# Patient Record
Sex: FEMALE | Race: OTHER | NOT HISPANIC OR LATINO | URBAN - METROPOLITAN AREA
[De-identification: names, ages, dates, MRNs, and addresses within clinical notes are randomized per-mention and may not be internally consistent; named-entity substitution may affect disease eponyms.]

---

## 2022-06-21 ENCOUNTER — APPOINTMENT (OUTPATIENT)
Dept: RADIOLOGY | Age: 62
End: 2022-06-21
Payer: COMMERCIAL

## 2022-06-21 ENCOUNTER — OFFICE VISIT (OUTPATIENT)
Dept: URGENT CARE | Age: 62
End: 2022-06-21
Payer: COMMERCIAL

## 2022-06-21 VITALS
WEIGHT: 154 LBS | TEMPERATURE: 97.5 F | BODY MASS INDEX: 25.66 KG/M2 | HEART RATE: 88 BPM | HEIGHT: 65 IN | RESPIRATION RATE: 18 BRPM | OXYGEN SATURATION: 99 %

## 2022-06-21 DIAGNOSIS — M25.552 LEFT HIP PAIN: ICD-10-CM

## 2022-06-21 DIAGNOSIS — M25.552 LEFT HIP PAIN: Primary | ICD-10-CM

## 2022-06-21 PROCEDURE — 73502 X-RAY EXAM HIP UNI 2-3 VIEWS: CPT

## 2022-06-21 PROCEDURE — 99213 OFFICE O/P EST LOW 20 MIN: CPT

## 2022-06-21 RX ORDER — METHOCARBAMOL 500 MG/1
500 TABLET, FILM COATED ORAL 3 TIMES DAILY
Qty: 15 TABLET | Refills: 0 | Status: SHIPPED | OUTPATIENT
Start: 2022-06-21 | End: 2022-06-26

## 2022-06-21 NOTE — PROGRESS NOTES
[address]        NAME: Zara Preston is a 63 y o  female  : 1960    MRN: [id number]  DATE: 2022  TIME: 4:28 PM    Assessment and Plan   Left hip pain [M25 552]  1  Left hip pain  XR hip/pelv 2-3 vws left if performed    Ambulatory Referral to Physical Therapy    Ambulatory Referral to Orthopedic Surgery    Diclofenac Sodium (VOLTAREN) 1 %    methocarbamol (ROBAXIN) 500 mg tablet     Patient states last Thursday she started having some left hip pain- she felt a jolt of pain that came towards front of leg  She googled at home stretches and felt better  On Saturday she felt the odd pop again  She was dancing with the children at [employer] over the weekend  There is some bruising to her outer left thigh  She denies injury, fall , trauma  She believes her hip and muscle are dislocated  Assessment notes mild swelling and bruising to left outer thigh  She has TTP  Has been ambulating and dancing on it  Mack Curio performed indicates no obvious abnormality  Referral to ortho and PT provided  Crutches were provided as requested by patient  Will also order short course of robaxin and diclofenac    Patient Instructions       Follow up with PCP as needed  Proceed to  ER if symptoms worsen  Chief Complaint     Chief Complaint   Patient presents with    Hip Pain     Left hip pain x4 days after feeling spasm  Patient states she feels it is out of place  Patient, however, was able to do dancing over the weekend  Patient has bruising to the left upper thigh  History of Present Illness       Patient states last Thursday she started having some left hip pain- she felt a jolt of pain that came towards front of leg  She googled at home stretches and felt better  On Saturday she felt the odd pop again  She was dancing with the children at [employer] over the weekend  There is some bruising to her outer left thigh  She denies injury, fall , trauma  She believes her hip and muscle are dislocated  Review of Systems   Review of Systems   Constitutional: Negative for chills and fever  HENT: Negative for congestion, ear pain, postnasal drip, sinus pain and sore throat  Eyes: Negative for pain and itching  Respiratory: Negative for cough, shortness of breath and wheezing  Cardiovascular: Negative for chest pain and palpitations  Gastrointestinal: Negative for abdominal pain, constipation, diarrhea, nausea and vomiting  Genitourinary: Negative for difficulty urinating and hematuria  Musculoskeletal: Positive for arthralgias and myalgias  Skin: Negative for rash  Neurological: Negative for dizziness, light-headedness and headaches  Psychiatric/Behavioral: Negative for agitation and sleep disturbance  The patient is not nervous/anxious            Current Medications       Current Outpatient Medications:     Diclofenac Sodium (VOLTAREN) 1 %, Apply 2 g topically 4 (four) times a day for 13 days, Disp: 100 g, Rfl: 0    methocarbamol (ROBAXIN) 500 mg tablet, Take 1 tablet (500 mg total) by mouth 3 (three) times a day for 5 days, Disp: 15 tablet, Rfl: 0    Cholecalciferol (Vitamin D3) 50 MCG (2000 UT) capsule, Take 1 capsule (2,000 Units total) by mouth daily (Patient not taking: Reported on 6/21/2022), Disp: 90 capsule, Rfl: 3    sertraline (ZOLOFT) 25 mg tablet, Take 1 tablet (25 mg total) by mouth daily (Patient not taking: Reported on 6/21/2022), Disp: 90 tablet, Rfl: 3    triamcinolone (KENALOG) 0 5 % cream, Apply topically 2 (two) times a day (Patient not taking: Reported on 6/21/2022), Disp: 90 g, Rfl: 1    Current Allergies     Allergies as of 06/21/2022 - Reviewed 06/21/2022   Allergen Reaction Noted    Amoxicillin  10/14/2020            The following portions of the patient's history were reviewed and updated as appropriate: allergies, current medications, past family history, past medical history, past social history, past surgical history and problem list      Past Medical History:   Diagnosis Date    Cancer [facility])        Past Surgical History:   Procedure Laterality Date    HYSTERECTOMY         History reviewed  No pertinent family history  Medications have been verified  Objective   Pulse 88   Temp 97 5 °F (36 4 °C) (Tympanic)   Resp 18   Ht 5' 5" (1 651 m)   Wt 69 9 kg (154 lb)   SpO2 99%   BMI 25 63 kg/m²   No LMP recorded  Patient is postmenopausal        Physical Exam     Physical Exam  Vitals reviewed  Constitutional:       General: She is not in acute distress  Appearance: Normal appearance  HENT:      Head: Normocephalic and atraumatic  Right Ear: Tympanic membrane and ear canal normal       Left Ear: Tympanic membrane and ear canal normal       Mouth/Throat:      Mouth: Mucous membranes are moist       Pharynx: No oropharyngeal exudate or posterior oropharyngeal erythema  Eyes:      Extraocular Movements: Extraocular movements intact  Conjunctiva/sclera: Conjunctivae normal       Pupils: Pupils are equal, round, and reactive to light  Cardiovascular:      Rate and Rhythm: Normal rate and regular rhythm  Pulses: Normal pulses  Heart sounds: Normal heart sounds  No murmur heard  Pulmonary:      Effort: Pulmonary effort is normal  No respiratory distress  Breath sounds: Normal breath sounds  Abdominal:      General: Abdomen is flat  Bowel sounds are normal  There is no distension  Palpations: Abdomen is soft  Tenderness: There is no abdominal tenderness  There is no guarding or rebound  Musculoskeletal:         General: Swelling and tenderness present  Normal range of motion  Cervical back: Normal range of motion and neck supple  No tenderness  Skin:     General: Skin is warm  Findings: Bruising present  Neurological:      General: No focal deficit present  Mental Status: She is alert     Psychiatric:         Mood and Affect: Mood normal          Behavior: Behavior normal          Judgment: Judgment normal

## 2022-06-23 ENCOUNTER — TELEPHONE (OUTPATIENT)
Dept: OBGYN CLINIC | Facility: HOSPITAL | Age: 62
End: 2022-06-23

## 2022-06-23 NOTE — TELEPHONE ENCOUNTER
Patient  Felt pop last Thursday, June 16  Patient seen in urgent care 6/21     Left hip pain  bruising down thigh , hot, swelling  And numbness  unable to walk or put any weight on left side  Feeling tired      History of urterine / cervical cancer  7 yrs ago  Patient concerned       Call transferred to [facility])

## 2022-06-23 NOTE — TELEPHONE ENCOUNTER
I advised that we would try for force on appt - Patient mahin Maradiaga  Advised that in the meantime she should go to ER for any worsening of symptoms  Verbalized understanding  Kathy Hurtado - not sure if you have any possibilities for this patient  Orville Berrios

## 2022-06-24 ENCOUNTER — OFFICE VISIT (OUTPATIENT)
Dept: OBGYN CLINIC | Facility: MEDICAL CENTER | Age: 62
End: 2022-06-24
Payer: COMMERCIAL

## 2022-06-24 VITALS — WEIGHT: 150 LBS | BODY MASS INDEX: 24.99 KG/M2 | HEIGHT: 65 IN

## 2022-06-24 DIAGNOSIS — M25.552 LEFT HIP PAIN: ICD-10-CM

## 2022-06-24 DIAGNOSIS — M25.452 HIP SWELLING, LEFT: ICD-10-CM

## 2022-06-24 DIAGNOSIS — M25.552 ACUTE HIP PAIN, LEFT: Primary | ICD-10-CM

## 2022-06-24 DIAGNOSIS — Z85.42 HISTORY OF UTERINE CANCER: ICD-10-CM

## 2022-06-24 PROCEDURE — 99204 OFFICE O/P NEW MOD 45 MIN: CPT | Performed by: STUDENT IN AN ORGANIZED HEALTH CARE EDUCATION/TRAINING PROGRAM

## 2022-06-24 NOTE — PROGRESS NOTES
1  Acute hip pain, left  MRI hip left wo contrast   2  History of uterine cancer  MRI hip left wo contrast   3  Hip swelling, left  MRI hip left wo contrast     Orders Placed This Encounter   Procedures    MRI hip left wo contrast        Imaging Studies (I personally reviewed images in PACS and report):     X-ray left hip 06/21/2022:  No acute osseous abnormalities or significant degenerative changes  IMPRESSION:   Acute atraumatic left hip pain for 1-2 weeks   Soft tissue ecchymosis over the lateral aspect of her hip   Of note, patient states having history of uterine cancer in which she had a hysterectomy at 50  She had admitted to needing regular checkup/imaging but has not undergone these imagings   While clinical exam could be considered clinically consistent with bursitis, I will order an MRI of her left hip to evaluate for soft tissue cancer as a potential source of pain as well given her pain was without trauma      PLAN:     Clinical exam and radiographic imaging reviewed with patient today, with impression as per above  I have discussed with the patient the pathophysiology of this diagnosis and reviewed how the examination correlates with this diagnosis   Given patient's fear of her of complications from her prior uterine cancer with a radical hysterectomy and new onset pain of her left hip without an injury along with ecchymoses hyperalgesia tenderness over hip, I have ordered an MRI of her hip without contrast for further evaluation  Will follow-up with her in regards to the results afterwards   In the interim, recommended p r n  Use of NSAIDs, acetaminophen, heat therapy 20 minutes on/off  I recommended against any dancing activities at this time other than weight-bearing as tolerated on her left lower extremity  Return for Follow up after MRI  Portions of the record may have been created with voice recognition software   Occasional wrong word or "sound a like" substitutions may have occurred due to the inherent limitations of voice recognition software  Read the chart carefully and recognize, using context, where substitutions have occurred  CHIEF COMPLAINT:  Chief Complaint   Patient presents with    Left Hip - Pain         HPI:  Shanon Matthews is a 63 y o  female  who presents for       Visit 6/24/2022 :  Initial evaluation of left hip pain:  Patient reports atraumatic left hip pain for the past 1-2 weeks  Reports pain is localized over the left lateral/posterior/anterior aspect of her hip can radiate down to her knee  She states it is sharp/throbbing/numb and of moderate to severe intensity  She is concerned because there is also been ecchymosis of her hip as well  She also reports a sensation of her hip giving out when weight-bearing on her left lower extremity  She denies any unintentional weight loss, nighttime diaphoresis, fevers/chills  She states it is aggravated when transitioning from sitting to standing, lying on her hip, different range of motions of her hip  She has been using Tylenol, NSAIDs as, muscle rubs all with minimal to no relief  Of note, patient reports a history of uterine cancer with a subsequenty hysterectomy  She states this was done which when she was 50years old  She admits that she was supposed to have regular followups with the oncologist to have regular imaging done of her pelvis but has not done this for several years  Medical, Surgical, Family, and Social History    Past Medical History:   Diagnosis Date    Cancer [facility])      Past Surgical History:   Procedure Laterality Date    HYSTERECTOMY       Social History   Social History     Substance and Sexual Activity   Alcohol Use Never     Social History     Substance and Sexual Activity   Drug Use Never     Social History     Tobacco Use   Smoking Status Never Smoker   Smokeless Tobacco Never Used     History reviewed  No pertinent family history    Allergies   Allergen Reactions    Amoxicillin           Physical Exam  Ht 5' 5" (1 651 m)   Wt 68 kg (150 lb)   BMI 24 96 kg/m²     Constitutional:  see vital signs  Gen: well-developed, normocephalic/atraumatic, well-groomed, anxious withdistress  Eyes: No inflammation or discharge of conjunctiva or lids; sclera clear   Pulmonary/Chest: Effort normal  No respiratory distress  Left Hip Exam     Tenderness   The patient is experiencing tenderness in the lateral, greater trochanter and posterior (hyperalgesic response to light palpation)  Range of Motion   Flexion: 110   External rotation: 70   Internal rotation: 30     Muscle Strength   Left hip normal muscle strength: Resisted abduction and flexion aggravate her lateral hip/leg pain  Abduction: 4/5   Adduction: 5/5   Flexion: 4/5     Tests   DAMIEN: positive  Sharmila: negative    Other   Sensation: decreased    Comments: On inspection, there is ecchymosis over the lateral aspect of her hip      Gait: antalgic              Procedures

## 2022-07-06 ENCOUNTER — HOSPITAL ENCOUNTER (OUTPATIENT)
Dept: RADIOLOGY | Facility: IMAGING CENTER | Age: 62
Discharge: HOME | End: 2022-07-06
Payer: COMMERCIAL

## 2022-07-06 DIAGNOSIS — M25.452 HIP SWELLING, LEFT: ICD-10-CM

## 2022-07-06 DIAGNOSIS — M25.552 ACUTE HIP PAIN, LEFT: ICD-10-CM

## 2022-07-06 DIAGNOSIS — Z85.42 HISTORY OF UTERINE CANCER: ICD-10-CM

## 2022-07-06 PROCEDURE — 73721 MRI JNT OF LWR EXTRE W/O DYE: CPT

## 2022-07-06 PROCEDURE — G1004 CDSM NDSC: HCPCS

## 2022-07-18 ENCOUNTER — TELEPHONE (OUTPATIENT)
Dept: OBGYN CLINIC | Facility: MEDICAL CENTER | Age: 62
End: 2022-07-18

## 2022-07-18 NOTE — TELEPHONE ENCOUNTER
Patient sees Dr Lc Urbina,  Patient is requesting a call about her left hip MRI, she is asking for the results and a call back      # [phone]

## 2022-07-20 NOTE — TELEPHONE ENCOUNTER
Please see previous note  Patient returned call from Dr Josias Guerrero  Spoke with patient   Relayed message  Patient would like return call regarding this        [phone]

## 2022-07-21 NOTE — TELEPHONE ENCOUNTER
Pt is calling back stating that she would like better clarification on her MRI results   Pt doesn't understand what she was told about the results    TE#[phone]

## 2022-08-05 ENCOUNTER — OFFICE VISIT (OUTPATIENT)
Dept: DENTISTRY | Facility: CLINIC | Age: 62
End: 2022-08-05

## 2022-08-05 VITALS — TEMPERATURE: 97.3 F | SYSTOLIC BLOOD PRESSURE: 121 MMHG | HEART RATE: 82 BPM | DIASTOLIC BLOOD PRESSURE: 80 MMHG

## 2022-08-05 DIAGNOSIS — K08.109 TEETH MISSING: Primary | ICD-10-CM

## 2022-08-05 DIAGNOSIS — Z59.9 FINANCIAL DIFFICULTIES: ICD-10-CM

## 2022-08-05 PROCEDURE — D0210 INTRAORAL - COMPLETE SERIES OF RADIOGRAPHIC IMAGES: HCPCS | Performed by: DENTIST

## 2022-08-05 SDOH — ECONOMIC STABILITY - INCOME SECURITY: PROBLEM RELATED TO HOUSING AND ECONOMIC CIRCUMSTANCES, UNSPECIFIED: Z59.9

## 2022-08-05 NOTE — PROGRESS NOTES
Comprehensive Exam    Krissy Bender presented to [address] [place] [address] for comprehensive exam  Pt's CC is that she has lost a lot of confidence and doesn't want to smile  She feels her face has changed shape since her upper molars were extracted  Pt states she went to an oral surgeon in [place] and they placed two implants (she stated about 6 months ago) on UL after bone grafting  She states they removed her UR teeth and during that process, her cheek was sutured to her ridge  She then state she needed gum grafting where they took palatal tissue and used that for a graft  She said they cannot place implants on UR because there is not enough bone  She says she spent $8,000 on the treatment and is still paying it off  Pt said she cannot afford going there anymore and it is difficult for her transportation wise to get to [place]  Pt also states she wakes up with migraines every morning and believes it is from loosing these back teeth and grinding  Pt also unhappy with esthetics of anterior teeth as the bonding is discolored  Additionally, pt is unhappy with the dark color of teeth #4 and #12 due to the leaking amalgam restorations  Pt also noted a crack in #12  FMX taken  Pneumatized/low sinus on UR  Implants at sites #13 and #14  Broken endodontic file in tooth #19 - pt stated [place] #[place]  Restorations and caries noted on tooth chart     Consulted with Dr Fuentes Mckinney about implants and UR quadrant  Dr Fuentes Mckinney said we cannot complete any surgeries on UR (sinus lift, ridge agmentation here) - that would need to be referred out  Dr Fuentes Mckinney also stated implants cannot be restored here due to not having the parts  Pt stated she did not want any more surgeries completed  Pt was very emotional during the visit and started crying  Most of the appt was getting to know pt and learning about hx of her dental tx   Pt was very concerned with esthetics of anterior teeth  Explained to pt we can create a plan for the anterior teeth, but not today as there are a few more steps to complete (perio/hygiene, preliminary impressions, discussion of fixed vs bonding, removable to replace teeth, etc)  Explained to pt treatment would not be overnight, it will take time  Also suggested to pt to try an over the counter   Pt was very concerned with finances, explained to pt I do not know the cost of the treatment and what the treatment plan is yet  Pt understood       NV: perio charting and evaluation to determine if SRPs or prophy are needed

## 2022-08-16 ENCOUNTER — OFFICE VISIT (OUTPATIENT)
Dept: DENTISTRY | Facility: CLINIC | Age: 62
End: 2022-08-16

## 2022-08-16 DIAGNOSIS — Z01.20 ENCOUNTER FOR DENTAL EXAMINATION: Primary | ICD-10-CM

## 2022-08-16 PROBLEM — C53.9 CERVICAL CANCER (HCC): Status: ACTIVE | Noted: 2017-04-24

## 2022-08-16 PROCEDURE — D0191 ASSESSMENT OF A PATIENT: HCPCS

## 2022-08-16 RX ORDER — IBUPROFEN 200 MG
200 TABLET ORAL EVERY 6 HOURS PRN
COMMUNITY

## 2022-08-16 RX ORDER — BUPROPION HYDROCHLORIDE 75 MG/1
75 TABLET ORAL 2 TIMES DAILY
COMMUNITY

## 2022-08-16 NOTE — PROGRESS NOTES
Pt was originally scheduled for comp exam/perio charting today  She was here on 8-5-22 with Dr Chencho Menchaca for NP visit, [facility] and exam but no perio charting performed  Upon seating patient and letting her know she was here for perio charting and comp exam, she became very negative telling me she was here for a cleaning and that is what the doctor told her who saw her last  I explained to her the process and that we need a full perio chart to determine what type of cleaning she will be returning for  I also explained that according to the doctor's notes, she was to return for perio charting  She said " that's not what I was told at my last appointment"  She then said she wanted some fillings and/or a cleaning done today  I explained due to time constraints and the importance of proper periodontal documentation, we cannot do that today  Full Perio charting completed with loc 4-5mm w/ loc bup  Gen stain and supra calculus buildup  Recommend she return for adult prophy  Recommend she return w/ any resident to start anterior restorations  She would like her existing upper anterior restorations replaced due to staining  She also would like #4 and 12 existing amalgams replaced due to cosmetics  Dr Demi Ace recommend patient return to doctor who placed implants on 13 and  14 to have restored  Briefly discussed bridge to replace 2 and 3 when patient is ready      No doctor exam was performed today due to amount of time taken discussing what treatment was to be performed today and why     1)Adult prophy/exam  2)start anterior restorations

## 2022-08-22 ENCOUNTER — OFFICE VISIT (OUTPATIENT)
Dept: DENTISTRY | Facility: CLINIC | Age: 62
End: 2022-08-22

## 2022-08-22 DIAGNOSIS — Z01.21 ENCOUNTER FOR DENTAL EXAMINATION AND CLEANING WITH ABNORMAL FINDINGS: Primary | ICD-10-CM

## 2022-08-22 PROCEDURE — D0150 COMPREHENSIVE ORAL EVALUATION - NEW OR ESTABLISHED PATIENT: HCPCS | Performed by: DENTIST

## 2022-08-22 PROCEDURE — D1110 PROPHYLAXIS - ADULT: HCPCS

## 2022-08-22 NOTE — PROGRESS NOTES
EXAM, ADULT PROPHY    REVIEWED MED HX: meds, allergies, health changes reviewed in EPIC  CHIEF CONCERN:  none   PAIN SCALE:  0  ASA CLASS:  I  PLAQUE:  Mod loc  CALCULUS:  Mod loc  BLEEDING: slight gen  STAIN :  Heavy gen  ORAL HYGIENE: fair  PERIO: stage 1, gradeb    cavitron and Hand scaled, polished and flossed  Max anteriors existing chipping restorations that are collecting stain at margins  Oral Hygiene Instruction:  recommended brushing 2 x daily for 2 minutes MIN, recommended flossing daily, reviewed dietary precautions  Visual and Tactile Intraoral/ Extraoral evaluation: Oral and Oropharyngeal cancer evaluation  No findin    Dr Demi Ace exam  Caries added to treatment plan  Discussed bridge on #1-4    REFERRALS: recommend patient return to [facility] in [place] that placed implants on 13 and 14, to have them finished  CARIES FINDINGS: caries noted    Next Visit:start restorations (patient already scheduled) Patient would like to start w/ Max anterior fillings       Next Recall: 6 month recall     Last FMX : 8-5-22

## 2022-09-21 ENCOUNTER — APPOINTMENT (OUTPATIENT)
Dept: RADIOLOGY | Age: 62
End: 2022-09-21
Payer: COMMERCIAL

## 2022-09-21 DIAGNOSIS — U07.1 COVID-19 VIRUS INFECTION: ICD-10-CM

## 2022-09-21 PROCEDURE — 71046 X-RAY EXAM CHEST 2 VIEWS: CPT

## 2022-10-04 ENCOUNTER — PATIENT OUTREACH (OUTPATIENT)
Dept: DENTISTRY | Facility: CLINIC | Age: 62
End: 2022-10-04

## 2022-10-04 NOTE — PROGRESS NOTES
Chart review indicates referral was made to follow up with patient for at risk responses to SDOH  Patient picked up phone call, but stated that she has been sick from covid for awhile and did not feel up to speaking at this time and requested a call back at later date  Fatigue, cough and SOB  Patient feeling fine at this time and denies SOB at this time, but reports dx of pneumonia and that she is currently on antibiotics  [facility] informed her that if her symptoms get worse she should contact her doctor or schedule an appointment to be seen  Patient reports not being happy with her PCP in that they do not follow up in a timely manner and that if her condition worsens she will go to the urgent care  [facility] will follow back up with patient at later time to address to [facility]

## 2022-10-06 ENCOUNTER — TELEPHONE (OUTPATIENT)
Dept: PULMONOLOGY | Facility: CLINIC | Age: 62
End: 2022-10-06

## 2022-10-06 NOTE — TELEPHONE ENCOUNTER
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

## 2022-10-07 ENCOUNTER — PATIENT OUTREACH (OUTPATIENT)
Dept: DENTISTRY | Facility: CLINIC | Age: 62
End: 2022-10-07

## 2022-10-07 NOTE — PROGRESS NOTES
REBECCA CALDERON made second attempt to contact Pt by phone today in reference to referral for SDOH needs however there was no response  REBECCA CALDERON left  for returned call

## 2022-10-11 ENCOUNTER — PATIENT OUTREACH (OUTPATIENT)
Dept: DENTISTRY | Facility: CLINIC | Age: 62
End: 2022-10-11

## 2022-10-11 NOTE — PROGRESS NOTES
REBECCA CALDERON made third attempt to outreach Pt in regards to [facility] referral however there was no response  REBECCA CALDERON left vm for returned call  REBECCA CALDERON will close referral at this time and send letter to Pt home for further outreach attempts  REBECCA CALDERON will remain available for further assistance as needed

## 2023-03-07 ENCOUNTER — OFFICE VISIT (OUTPATIENT)
Dept: DENTISTRY | Facility: CLINIC | Age: 63
End: 2023-03-07

## 2023-03-07 VITALS — SYSTOLIC BLOOD PRESSURE: 145 MMHG | DIASTOLIC BLOOD PRESSURE: 81 MMHG | HEART RATE: 79 BPM

## 2023-03-07 DIAGNOSIS — M26.32: Primary | ICD-10-CM

## 2023-03-07 NOTE — PROGRESS NOTES
Composite Filling #6-MIFL, #8-MIFL, #9-MIFL    Nandini Luque presents for composite filling #6-MIFL, #8-MIFL, #9-MIFL  Pt  Is very concerned about esthetics of teeth and says she has a wedding in 2 weeks  PMH reviewed, no changes  Pt  Is ASA2  Pt  Has no pain  Pt  CC: "I had bonding before and it broke and my teeth look black"  Discussed with patient need for RCT if pulp exposure occurs or in future if pulp is inflamed  Pt understands and consents  Applied topical benzocaine, administered 2 carps 4% articaine 1:100k epi via local infiltration  Prepped teeth #6-MIFL, #8-MIFL, #9-MIFL with 245 carbide on high speed  Pt  Has large diastemas between all teeth and requested space closure between all teeth  Placed Mylar matrix  Isolation with cotton rolls  Etch with 37% H2PO4, rinse, dry  Applied Adhese with 20 second scrub once, gentle air dry and light cured for 10s  Restored with Tetric bulk manuel shade A2 and light cured  Pt  Says she does not like how her premolars appear black when she smiles  Pt  Says she wants to do something to make them not black and wants to close spaces  Explained to pt  That the only way to properly align the occlusion and cover the amalgam staining on premolars is with ortho to realign the bite and crowns/veneers to cover over the staining  Pt  Ana Dixon but is not interested in ortho  Refined with finishing burs, polished with enhance point  Verified occlusion and contacts  Pt left satisfied      NV: continue resins

## 2023-03-10 ENCOUNTER — OFFICE VISIT (OUTPATIENT)
Dept: DENTISTRY | Facility: CLINIC | Age: 63
End: 2023-03-10

## 2023-03-10 VITALS — HEART RATE: 79 BPM | DIASTOLIC BLOOD PRESSURE: 90 MMHG | SYSTOLIC BLOOD PRESSURE: 142 MMHG

## 2023-03-10 DIAGNOSIS — K02.62 DENTAL CARIES EXTENDING INTO DENTIN: Primary | ICD-10-CM

## 2023-03-10 NOTE — PROGRESS NOTES
Composite Filling    Eduar Branham presents for composite filling #11-MIFL  PMH reviewed, no changes  ASA: II  Pain: 0    Pt wanted space closed between #9-11 (10 missing and 11 is its place), pt aware that the tooth will look wider  Applied topical benzocaine, administered 1 5 carps 4% articaine 1:100k epi via infiltration     Prepped tooth #11 with 245 carbide on high speed  Caries removed with round carbide on slow speed  Placed mylar strip  Isolation with cotton rolls     Etch with 37% H2PO4, rinse, dry  Applied Adhese with 20 second scrub once, gentle air dry and light cured for 10s  Restored with Tetric bulk manuel shade A2 and light cured  Refined with finishing burs, polished with enhance point  Verified occlusion and contacts  Pt left satisfied      NV: resins